# Patient Record
Sex: FEMALE
[De-identification: names, ages, dates, MRNs, and addresses within clinical notes are randomized per-mention and may not be internally consistent; named-entity substitution may affect disease eponyms.]

---

## 2024-07-19 PROBLEM — Z00.00 ENCOUNTER FOR PREVENTIVE HEALTH EXAMINATION: Status: ACTIVE | Noted: 2024-07-19

## 2024-07-22 ENCOUNTER — APPOINTMENT (OUTPATIENT)
Dept: ORTHOPEDIC SURGERY | Facility: CLINIC | Age: 59
End: 2024-07-22

## 2024-07-29 ENCOUNTER — APPOINTMENT (OUTPATIENT)
Dept: ORTHOPEDIC SURGERY | Facility: CLINIC | Age: 59
End: 2024-07-29
Payer: MEDICAID

## 2024-07-29 VITALS — HEIGHT: 65 IN | BODY MASS INDEX: 28.66 KG/M2 | WEIGHT: 172 LBS

## 2024-07-29 DIAGNOSIS — M77.12 LATERAL EPICONDYLITIS, LEFT ELBOW: ICD-10-CM

## 2024-07-29 DIAGNOSIS — E78.00 PURE HYPERCHOLESTEROLEMIA, UNSPECIFIED: ICD-10-CM

## 2024-07-29 PROCEDURE — 73130 X-RAY EXAM OF HAND: CPT | Mod: LT

## 2024-07-29 PROCEDURE — 73080 X-RAY EXAM OF ELBOW: CPT | Mod: LT

## 2024-07-29 PROCEDURE — 99203 OFFICE O/P NEW LOW 30 MIN: CPT

## 2024-07-29 RX ORDER — ATORVASTATIN CALCIUM 80 MG/1
TABLET, FILM COATED ORAL
Refills: 0 | Status: ACTIVE | COMMUNITY

## 2024-07-29 NOTE — ASSESSMENT
[FreeTextEntry1] : - Wrist brace with heavy lifting and at night, DME script given - Continue nsaid for PCP if needed - Ice and tylenol as needed - PT referral - Follow up in 4-6 weeks. Can consider CSI if not improving

## 2024-07-29 NOTE — IMAGING
[de-identified] :  R elbow: - No obvious swelling, deformity, bruising - No pain with palpation over olecranon, radial head, medial epicondyle, or lateral epicondyle - ROM intact throughout flexion, extension, supination, pronation - 5/5 strength with elbow flexion, extension, supination, pronation. 5/5 strength with wrist flexion, extension  - Distally neurovascularly intact   L elbow: - No obvious swelling, deformity, bruising - No pain with palpation over olecranon, radial head, medial epicondyle, or lateral epicondyle - Pain to common extensor tendon - ROM intact throughout flexion, extension, supination, pronation - 5/5 strength with elbow flexion, extension, supination, pronation. 5/5 strength with wrist flexion, extension  - No laxity with varus or valgus stressing -- Worse pain with middle finger extension  - Negative tinels over cubital tunnel - Negative Hook test - Distally neurovascularly intact   [Left] : left hand [There are no fractures, subluxations or dislocations. No significant abnormalities are seen] : There are no fractures, subluxations or dislocations. No significant abnormalities are seen

## 2024-07-29 NOTE — HISTORY OF PRESENT ILLNESS
[3] : 3 [1] : 2 [Sharp] : sharp [Tingling] : tingling [de-identified] : 07/29/2024: Pt is a 58 yo female, who presents for evaluation of left elbow/ arm. She states that her elbow pain began 1 month ago, without specific injury, and has been intermittent ever since. Her pain is localized to lateral epicondyle, and she states she has associated radiating numbness and tingling down her arm, into her fingers. She states that there is no discomfort at rest, and pain occurs with movement, weight bearing. She works as [employer]. She has tried massage therapy with some relief and saw her PCP who prescribed her NSAID, with mild relief. [FreeTextEntry1] : left hand  [FreeTextEntry6] : numbness

## 2024-09-09 ENCOUNTER — APPOINTMENT (OUTPATIENT)
Dept: ORTHOPEDIC SURGERY | Facility: CLINIC | Age: 59
End: 2024-09-09